# Patient Record
Sex: FEMALE | Race: OTHER | Employment: OTHER | URBAN - METROPOLITAN AREA
[De-identification: names, ages, dates, MRNs, and addresses within clinical notes are randomized per-mention and may not be internally consistent; named-entity substitution may affect disease eponyms.]

---

## 2019-06-12 ENCOUNTER — HOSPITAL ENCOUNTER (OUTPATIENT)
Age: 69
Setting detail: OBSERVATION
Discharge: HOME | End: 2019-06-13
Attending: EMERGENCY MEDICINE | Admitting: HOSPITALIST
Payer: MEDICARE

## 2019-06-12 ENCOUNTER — APPOINTMENT (OUTPATIENT)
Dept: GENERAL RADIOLOGY | Age: 69
End: 2019-06-12
Payer: MEDICARE

## 2019-06-12 DIAGNOSIS — R07.9 CHEST PAIN, UNSPECIFIED TYPE: Primary | ICD-10-CM

## 2019-06-12 LAB
ALBUMIN SERPL-MCNC: 4.5 G/DL (ref 3.5–5.2)
ALP BLD-CCNC: 83 U/L (ref 35–104)
ALT SERPL-CCNC: 20 U/L (ref 0–32)
ANION GAP SERPL CALCULATED.3IONS-SCNC: 8 MMOL/L (ref 7–16)
AST SERPL-CCNC: 23 U/L (ref 0–31)
BILIRUB SERPL-MCNC: 0.3 MG/DL (ref 0–1.2)
BUN BLDV-MCNC: 25 MG/DL (ref 8–23)
CALCIUM SERPL-MCNC: 10.1 MG/DL (ref 8.6–10.2)
CHLORIDE BLD-SCNC: 102 MMOL/L (ref 98–107)
CO2: 32 MMOL/L (ref 22–29)
CREAT SERPL-MCNC: 1.1 MG/DL (ref 0.5–1)
GFR AFRICAN AMERICAN: 60
GFR NON-AFRICAN AMERICAN: 49 ML/MIN/1.73
GLUCOSE BLD-MCNC: 100 MG/DL (ref 74–99)
HCT VFR BLD CALC: 38.7 % (ref 34–48)
HEMOGLOBIN: 12.8 G/DL (ref 11.5–15.5)
MCH RBC QN AUTO: 30.8 PG (ref 26–35)
MCHC RBC AUTO-ENTMCNC: 33.1 % (ref 32–34.5)
MCV RBC AUTO: 93 FL (ref 80–99.9)
PDW BLD-RTO: 13.1 FL (ref 11.5–15)
PLATELET # BLD: 296 E9/L (ref 130–450)
PMV BLD AUTO: 9.1 FL (ref 7–12)
POTASSIUM SERPL-SCNC: 4 MMOL/L (ref 3.5–5)
RBC # BLD: 4.16 E12/L (ref 3.5–5.5)
SODIUM BLD-SCNC: 142 MMOL/L (ref 132–146)
TOTAL PROTEIN: 8.3 G/DL (ref 6.4–8.3)
TROPONIN: <0.01 NG/ML (ref 0–0.03)
WBC # BLD: 9 E9/L (ref 4.5–11.5)

## 2019-06-12 PROCEDURE — 36415 COLL VENOUS BLD VENIPUNCTURE: CPT

## 2019-06-12 PROCEDURE — 85027 COMPLETE CBC AUTOMATED: CPT

## 2019-06-12 PROCEDURE — 84484 ASSAY OF TROPONIN QUANT: CPT

## 2019-06-12 PROCEDURE — 80053 COMPREHEN METABOLIC PANEL: CPT

## 2019-06-12 PROCEDURE — 93005 ELECTROCARDIOGRAM TRACING: CPT | Performed by: EMERGENCY MEDICINE

## 2019-06-12 PROCEDURE — 99285 EMERGENCY DEPT VISIT HI MDM: CPT

## 2019-06-12 PROCEDURE — 71045 X-RAY EXAM CHEST 1 VIEW: CPT

## 2019-06-13 VITALS
TEMPERATURE: 98.2 F | OXYGEN SATURATION: 96 % | RESPIRATION RATE: 18 BRPM | DIASTOLIC BLOOD PRESSURE: 66 MMHG | HEIGHT: 66 IN | BODY MASS INDEX: 38.41 KG/M2 | SYSTOLIC BLOOD PRESSURE: 138 MMHG | WEIGHT: 239 LBS | HEART RATE: 66 BPM

## 2019-06-13 PROBLEM — M94.0 COSTOCHONDRITIS: Status: ACTIVE | Noted: 2019-06-13

## 2019-06-13 LAB
EKG ATRIAL RATE: 71 BPM
EKG P AXIS: 78 DEGREES
EKG P-R INTERVAL: 196 MS
EKG Q-T INTERVAL: 406 MS
EKG QRS DURATION: 86 MS
EKG QTC CALCULATION (BAZETT): 441 MS
EKG R AXIS: 25 DEGREES
EKG T AXIS: 47 DEGREES
EKG VENTRICULAR RATE: 71 BPM
TROPONIN: <0.01 NG/ML (ref 0–0.03)

## 2019-06-13 PROCEDURE — 6370000000 HC RX 637 (ALT 250 FOR IP): Performed by: NURSE PRACTITIONER

## 2019-06-13 PROCEDURE — G0378 HOSPITAL OBSERVATION PER HR: HCPCS

## 2019-06-13 PROCEDURE — 96372 THER/PROPH/DIAG INJ SC/IM: CPT

## 2019-06-13 PROCEDURE — 93010 ELECTROCARDIOGRAM REPORT: CPT | Performed by: INTERNAL MEDICINE

## 2019-06-13 PROCEDURE — 6370000000 HC RX 637 (ALT 250 FOR IP): Performed by: EMERGENCY MEDICINE

## 2019-06-13 PROCEDURE — 2580000003 HC RX 258: Performed by: HOSPITALIST

## 2019-06-13 PROCEDURE — APPSS60 APP SPLIT SHARED TIME 46-60 MINUTES: Performed by: NURSE PRACTITIONER

## 2019-06-13 PROCEDURE — 96375 TX/PRO/DX INJ NEW DRUG ADDON: CPT

## 2019-06-13 PROCEDURE — 99204 OFFICE O/P NEW MOD 45 MIN: CPT | Performed by: INTERNAL MEDICINE

## 2019-06-13 PROCEDURE — 6360000002 HC RX W HCPCS: Performed by: NURSE PRACTITIONER

## 2019-06-13 PROCEDURE — 96374 THER/PROPH/DIAG INJ IV PUSH: CPT

## 2019-06-13 PROCEDURE — 6360000002 HC RX W HCPCS: Performed by: HOSPITALIST

## 2019-06-13 PROCEDURE — 36415 COLL VENOUS BLD VENIPUNCTURE: CPT

## 2019-06-13 PROCEDURE — 6360000002 HC RX W HCPCS: Performed by: EMERGENCY MEDICINE

## 2019-06-13 PROCEDURE — 84484 ASSAY OF TROPONIN QUANT: CPT

## 2019-06-13 RX ORDER — DILTIAZEM HYDROCHLORIDE 240 MG/1
240 CAPSULE, COATED, EXTENDED RELEASE ORAL DAILY
Status: DISCONTINUED | OUTPATIENT
Start: 2019-06-13 | End: 2019-06-13 | Stop reason: HOSPADM

## 2019-06-13 RX ORDER — DILTIAZEM HYDROCHLORIDE EXTENDED-RELEASE TABLETS 240 MG/1
240 TABLET, EXTENDED RELEASE ORAL DAILY
COMMUNITY

## 2019-06-13 RX ORDER — IBUPROFEN 600 MG/1
600 TABLET ORAL EVERY 8 HOURS PRN
Qty: 30 TABLET | Refills: 0 | COMMUNITY
Start: 2019-06-13

## 2019-06-13 RX ORDER — ASPIRIN 81 MG/1
81 TABLET, CHEWABLE ORAL DAILY
Status: DISCONTINUED | OUTPATIENT
Start: 2019-06-13 | End: 2019-06-13 | Stop reason: HOSPADM

## 2019-06-13 RX ORDER — ROSUVASTATIN CALCIUM 10 MG/1
5 TABLET, COATED ORAL DAILY
Status: DISCONTINUED | OUTPATIENT
Start: 2019-06-13 | End: 2019-06-13 | Stop reason: HOSPADM

## 2019-06-13 RX ORDER — RANOLAZINE 500 MG/1
500 TABLET, EXTENDED RELEASE ORAL 2 TIMES DAILY
Status: DISCONTINUED | OUTPATIENT
Start: 2019-06-13 | End: 2019-06-13 | Stop reason: HOSPADM

## 2019-06-13 RX ORDER — HYDROCHLOROTHIAZIDE 25 MG/1
50 TABLET ORAL DAILY
Status: DISCONTINUED | OUTPATIENT
Start: 2019-06-13 | End: 2019-06-13 | Stop reason: HOSPADM

## 2019-06-13 RX ORDER — LEVOTHYROXINE SODIUM 0.1 MG/1
100 TABLET ORAL DAILY
Status: DISCONTINUED | OUTPATIENT
Start: 2019-06-13 | End: 2019-06-13 | Stop reason: HOSPADM

## 2019-06-13 RX ORDER — GABAPENTIN 100 MG/1
100 CAPSULE ORAL 2 TIMES DAILY
COMMUNITY

## 2019-06-13 RX ORDER — SODIUM CHLORIDE 0.9 % (FLUSH) 0.9 %
10 SYRINGE (ML) INJECTION PRN
Status: DISCONTINUED | OUTPATIENT
Start: 2019-06-13 | End: 2019-06-13 | Stop reason: HOSPADM

## 2019-06-13 RX ORDER — HYDRALAZINE HYDROCHLORIDE 20 MG/ML
10 INJECTION INTRAMUSCULAR; INTRAVENOUS ONCE
Status: COMPLETED | OUTPATIENT
Start: 2019-06-13 | End: 2019-06-13

## 2019-06-13 RX ORDER — RANOLAZINE 500 MG/1
500 TABLET, EXTENDED RELEASE ORAL 2 TIMES DAILY
COMMUNITY

## 2019-06-13 RX ORDER — NITROGLYCERIN 0.4 MG/1
0.4 TABLET SUBLINGUAL
COMMUNITY

## 2019-06-13 RX ORDER — KETOROLAC TROMETHAMINE 30 MG/ML
15 INJECTION, SOLUTION INTRAMUSCULAR; INTRAVENOUS ONCE
Status: COMPLETED | OUTPATIENT
Start: 2019-06-13 | End: 2019-06-13

## 2019-06-13 RX ORDER — SODIUM CHLORIDE 0.9 % (FLUSH) 0.9 %
10 SYRINGE (ML) INJECTION EVERY 12 HOURS SCHEDULED
Status: DISCONTINUED | OUTPATIENT
Start: 2019-06-13 | End: 2019-06-13 | Stop reason: HOSPADM

## 2019-06-13 RX ORDER — POTASSIUM CHLORIDE 750 MG/1
20 TABLET, FILM COATED, EXTENDED RELEASE ORAL DAILY
COMMUNITY

## 2019-06-13 RX ORDER — HYDROCHLOROTHIAZIDE 25 MG/1
50 TABLET ORAL DAILY
COMMUNITY

## 2019-06-13 RX ORDER — GABAPENTIN 100 MG/1
100 CAPSULE ORAL 2 TIMES DAILY
Status: DISCONTINUED | OUTPATIENT
Start: 2019-06-13 | End: 2019-06-13 | Stop reason: HOSPADM

## 2019-06-13 RX ORDER — ROSUVASTATIN CALCIUM 5 MG/1
5 TABLET, COATED ORAL DAILY
COMMUNITY

## 2019-06-13 RX ORDER — ONDANSETRON 2 MG/ML
4 INJECTION INTRAMUSCULAR; INTRAVENOUS EVERY 6 HOURS PRN
Status: DISCONTINUED | OUTPATIENT
Start: 2019-06-13 | End: 2019-06-13 | Stop reason: HOSPADM

## 2019-06-13 RX ORDER — LEVOTHYROXINE SODIUM 0.1 MG/1
100 TABLET ORAL DAILY
COMMUNITY

## 2019-06-13 RX ADMIN — Medication 10 ML: at 13:21

## 2019-06-13 RX ADMIN — NITROGLYCERIN 0.5 INCH: 20 OINTMENT TOPICAL at 00:32

## 2019-06-13 RX ADMIN — HYDROCHLOROTHIAZIDE 50 MG: 25 TABLET ORAL at 13:19

## 2019-06-13 RX ADMIN — KETOROLAC TROMETHAMINE 15 MG: 30 INJECTION, SOLUTION INTRAMUSCULAR; INTRAVENOUS at 13:21

## 2019-06-13 RX ADMIN — DILTIAZEM HYDROCHLORIDE 240 MG: 240 CAPSULE, EXTENDED RELEASE ORAL at 13:19

## 2019-06-13 RX ADMIN — ASPIRIN 81 MG 81 MG: 81 TABLET ORAL at 13:19

## 2019-06-13 RX ADMIN — ENOXAPARIN SODIUM 40 MG: 40 INJECTION SUBCUTANEOUS at 08:41

## 2019-06-13 RX ADMIN — Medication 10 ML: at 08:42

## 2019-06-13 RX ADMIN — GABAPENTIN 100 MG: 100 CAPSULE ORAL at 13:19

## 2019-06-13 RX ADMIN — HYDRALAZINE HYDROCHLORIDE 10 MG: 20 INJECTION INTRAMUSCULAR; INTRAVENOUS at 00:50

## 2019-06-13 SDOH — HEALTH STABILITY: MENTAL HEALTH: HOW OFTEN DO YOU HAVE A DRINK CONTAINING ALCOHOL?: NEVER

## 2019-06-13 ASSESSMENT — PAIN DESCRIPTION - ORIENTATION: ORIENTATION: RIGHT;LEFT

## 2019-06-13 ASSESSMENT — PAIN DESCRIPTION - FREQUENCY: FREQUENCY: CONTINUOUS

## 2019-06-13 ASSESSMENT — PAIN SCALES - GENERAL
PAINLEVEL_OUTOF10: 0
PAINLEVEL_OUTOF10: 6
PAINLEVEL_OUTOF10: 0
PAINLEVEL_OUTOF10: 4

## 2019-06-13 ASSESSMENT — PAIN DESCRIPTION - ONSET: ONSET: GRADUAL

## 2019-06-13 ASSESSMENT — PAIN DESCRIPTION - LOCATION: LOCATION: BACK

## 2019-06-13 ASSESSMENT — PAIN DESCRIPTION - PROGRESSION: CLINICAL_PROGRESSION: GRADUALLY IMPROVING

## 2019-06-13 ASSESSMENT — PAIN DESCRIPTION - PAIN TYPE: TYPE: ACUTE PAIN

## 2019-06-13 ASSESSMENT — PAIN DESCRIPTION - DESCRIPTORS: DESCRIPTORS: ACHING

## 2019-06-13 NOTE — ED NOTES
revitaled pt to fax sbar pt had elevated BP new orders per doctor and wait to send up until BP comes down     Ruthy Johnson RN  06/13/19 0001

## 2019-06-13 NOTE — ED PROVIDER NOTES
Department of Emergency Medicine   ED  Provider Note  Admit Date/RoomTime: 6/12/2019 10:52 PM  ED Room: 01/01      History of Present Illness:  6/12/19, Time: 10:45 PM       Rupa Bwoers is a 71 y.o. female presenting to the ED for chest pain, beginning yesterday. The complaint has been persistent, moderate in severity, and worsened by nothing. The pt reports that she has been having pain in her chest since yesterday, went to [facility] today for the same complaint and was advised to follow up with cardiologist and if worsened to go to the ED. Notes her symptoms never got better and came in for evaluation. States that she took 4 Nitro yesterday with minimal relief and took 4 ASA today and still had minimal relief. States that she was diaphoretic when the pain started but no so much today. Rates the pain a 6 on a scale. Hx of DM and HTN. Pt denies any loc, palpitations, fever, chills, nausea, vomiting, diarrhea, abdominal pain, neck pain, extremity pain, edema, HA, cough, congestion, visual disturbances, dysuria, constipation, hematuria, weakness, numbness, tingling or any other further symptoms at this time. Review of Systems:   Pertinent positives and negatives are stated within HPI, all other systems reviewed and are negative.    --------------------------------------------- PAST HISTORY ---------------------------------------------  Past Medical History:  has a past medical history of Arthritis, Diabetes mellitus ([facility] [address] Heart attack ([facility] [address] Hypertension, and Thyroid disease. Past Surgical History:  has no past surgical history on file. Social History:  reports that she has never smoked. She has never used smokeless tobacco. She reports that she drank alcohol. She reports that she has current or past drug history. Family History: family history includes Heart Attack in her father and sister. The patients home medications have been reviewed.     Allergies: Sulfa antibiotics      ---------------------------------------------------PHYSICAL EXAM--------------------------------------    Constitutional/General: Awake, Alert, and oriented x3, mild distress  Head: Normocephalic and atraumatic  Eyes: PERRL, EOMI,  Mouth: Oropharynx clear, handling secretions, no trismus  Neck: Supple, full ROM  Pulmonary: Lungs clear to auscultation bilaterally, no wheezes, rales, or rhonchi. Not in respiratory distress  Cardiovascular:  Regular rate. Regular rhythm. No murmurs, gallops, or rubs. 2+ distal pulses  Chest: No chest wall tenderness  Abdomen: Soft. Non tender. Non distended. +BS. No rebound, guarding, or rigidity. No pulsatile masses appreciated. Musculoskeletal: Bilateral ankle edema. Moves all extremities x 4. no clubbing or cyanosis. Capillary refill <3 seconds  Skin: warm and dry. No rashes. Neurologic: GCS 15, CN II-XII grossly intact, no focal deficits,  Psych: Normal Affect    -------------------------------------------------- RESULTS -------------------------------------------------  I have personally reviewed all laboratory and imaging results for this patient. Results are listed below.      LABS:  Results for orders placed or performed during the hospital encounter of 06/12/19   CBC   Result Value Ref Range    WBC 9.0 4.5 - 11.5 E9/L    RBC 4.16 3.50 - 5.50 E12/L    Hemoglobin 12.8 11.5 - 15.5 g/dL    Hematocrit 38.7 34.0 - 48.0 %    MCV 93.0 80.0 - 99.9 fL    MCH 30.8 26.0 - 35.0 pg    MCHC 33.1 32.0 - 34.5 %    RDW 13.1 11.5 - 15.0 fL    Platelets 040 544 - 150 E9/L    MPV 9.1 7.0 - 12.0 fL   Comprehensive Metabolic Panel   Result Value Ref Range    Sodium 142 132 - 146 mmol/L    Potassium 4.0 3.5 - 5.0 mmol/L    Chloride 102 98 - 107 mmol/L    CO2 32 (H) 22 - 29 mmol/L    Anion Gap 8 7 - 16 mmol/L    Glucose 100 (H) 74 - 99 mg/dL    BUN 25 (H) 8 - 23 mg/dL    CREATININE 1.1 (H) 0.5 - 1.0 mg/dL    GFR Non-African American 49 >=60 mL/min/1.73    GFR African American 60 Calcium 10.1 8.6 - 10.2 mg/dL    Total Protein 8.3 6.4 - 8.3 g/dL    Alb 4.5 3.5 - 5.2 g/dL    Total Bilirubin 0.3 0.0 - 1.2 mg/dL    Alkaline Phosphatase 83 35 - 104 U/L    ALT 20 0 - 32 U/L    AST 23 0 - 31 U/L   Troponin   Result Value Ref Range    Troponin <0.01 0.00 - 0.03 ng/mL       RADIOLOGY:  Interpreted by Radiologist.  XR CHEST PORTABLE   Final Result      Atelectasis versus infiltrate of the lung bases. EKG:  This EKG is signed and interpreted by the EP. Time: 22:38  Rate: 71  Rhythm: Sinus  Interpretation: no acute changes  Comparison: no previous EKG available    ------------------------- NURSING NOTES AND VITALS REVIEWED ---------------------------   The nursing notes within the ED encounter and vital signs as below have been reviewed by myself. BP (!) 173/103   Pulse 87   Temp 98.7 °F (37.1 °C) (Temporal)   Resp 14   Ht 5' 6\" (1.676 m)   Wt 230 lb (104.3 kg)   SpO2 95%   Breastfeeding? No   BMI 37.12 kg/m²   Oxygen Saturation Interpretation: Normal    The patients available past medical records and past encounters were reviewed. ------------------------------ ED COURSE/MEDICAL DECISION MAKING----------------------  Medications   nitroglycerin (NITRO-BID) 2 % ointment 0.5 inch (has no administration in time range)       Medical Decision Making:    Patient assessed. Imaging and Labs obtained and reviewed; results discussed with patient. This patient's ED course included: a personal history and physical examination and re-evaluation prior to disposition. This patient has been closely monitored during their ED course. Re-Evaluations:           Re-evaluation. Patient symptoms show no change. Pt made aware of findings. Consultations:             IM  Counseling: The emergency provider has spoken with the patient and discussed todays results, in addition to providing specific details for the plan of care and counseling regarding the diagnosis and prognosis.

## 2019-06-13 NOTE — ED NOTES
Bed: 01  Expected date:   Expected time:   Means of arrival:   Comments:  triage     Lj Morales RN  06/12/19 0261

## 2019-06-13 NOTE — CONSULTS
emergency department, her vital signs were temporal  temperature of 98.7, heart rate 87 beats per minute, respiratory rate  14, blood pressure 173/103, oxygen saturation 95% on room air. A  12-lead EKG was obtained that showed sinus rhythm. WBC is 9, H and H  12.8 and 38.7.  BUN/SCR 25/1.1. Troponin less than 0.01. Chest x-ray  with atelectasis versus infiltrate to the lung bases. She received  0.5-inch nitroglycerin paste and was admitted to a telemetry monitored  unit for further evaluation and management. Cardiology was consulted by  Dr. Jody Vásquez for further evaluation of chest pain with a history of MI and  coronary artery disease. Currently, the patient is sitting up in bed. She denies chest pain,  dyspnea. Vital signs are stable. Telemetry monitoring shows sinus  rhythm and she is in no acute distress. PAST MEDICAL/SURGICAL HISTORY:  1. Reported coronary artery disease, status post cardiac  catheterization without intervention, both in 2008 and 2012 [facility], reports requested, currently unavailable for further  evaluation. Per the patient, no intervention warranted, per the patient  with collateral vessels). 2.  Diabetes mellitus. 3.  Hypertension. 4.  Obesity. 5.  Hypothyroidism, on replacement therapy. 6.  Chronic right knee pain. 7.  History of pelvic mass and complex endometrial hyperplasia, status  post D and C and hysterectomy. 8.  Arthritis. 9.  Status post right knee replacement. 10.  Status post tonsillectomy. 12.  Status post lumbar back surgery in 2012 (for herniated disk). 13.  Chronic kidney disease stage 2. FAMILY HISTORY:  Please note this information was not obtained at this  time as this is limited in nature due to the patient's advanced age. SOCIAL HISTORY:  Denies tobacco, alcohol, or illicit drug use. Caffeine  intake includes one cup of regular coffee daily, \"the rest of the coffee  I have throughout the day is Decaf. \"    ALLERGIES: SULFA.    HOME MEDICATIONS:  Aspirin 81 mg daily, Cardizem 240 mg daily,  Neurontin, hydrochlorothiazide 50 mg daily, Synthroid 100 mcg daily,  metformin, sublingual nitroglycerin as needed, K-Dur 20 mEq daily,  Ranexa 500 mg twice daily, and Crestor 5 mg daily. HOSPITAL MEDICATIONS:  Aspirin 81 mg daily, Cardizem  mg daily,  Lovenox 40 mg daily, Neurontin, hydralazine 10 mg x1 IV,  hydrochlorothiazide 50 mg daily, Synthroid 100 mcg daily, Ranexa 500 mg  twice daily, Crestor 5 mg daily. REVIEW OF SYSTEMS:  CONSTITUTIONAL:  Denies fevers, chills, night  sweats, weight loss, or fatigue. HEENT:  Denies headaches, nosebleeds,  oral pain, abscess, or lesion. Complains of blurred vision at times. MUSCULOSKELETAL:  Denies falls. Denies pain to bilateral lower  extremities with ambulation. Complains of edema to bilateral lower  extremities. See HPI. NEURO:  Complains of dizziness, lightheadedness  without fall or syncope. See HPI. Denies numbness and tingling. CARDIAC:  Chest pain. See HPI. Denies palpitations, feelings of her  heart racing. LUNGS:  Denies orthopnea and PND. GI:  Denies heartburn,  abdominal pain, nausea, vomiting, diarrhea, constipation;  black/bloody/tarry stools. :  Denies dysuria and hematuria. HEME:   Denies excessive bleeding or bruising. LYMPHS:  Denies lumps and bumps  to neck, axilla, breast, and groin. ENDOCRINE:  Denies excessive  thirst.  Denies intolerances to hot and cold. PSYCHIATRIC:  Denies  anxiety and depression. PHYSICAL EXAMINATION:  VITAL SIGNS:  Oral temperature 97.2, heart rate 70 beats per minute,  respiratory rate 16, blood pressure 140/66, oxygen saturation 96% on  room air. Weight 239 pounds, BMI 38.7. CONSTITUTIONAL:  In general, this is a well-developed, obese, elderly  [facility] female, who appears her stated age, who is awake,  alert, cooperative, in no apparent distress. HEENT:  Eyes:  Conjunctivae pink. No noted xanthomas. Oral mucosa pink  and moist.  NECK:  No stridor. Symmetrical, nontender. No JVD. CHEST:  Symmetrical.  Nontender with palpation. No accessory muscle  use. No intercostal retractions. Lung on auscultation, clear to all  fields. CARDIOVASCULAR:  No carotid bruit noted. Heart on inspection shows no  noted pulsations. Heart on palpation, no heaves or thrills. PMI  nondisplaced. Heart on auscultation, regular rate and rhythm. No  murmur or rub. PERIPHERAL VASCULAR:  No bilateral lower extremity edema. Pedal pulses  are palpable and equal.  No clubbing or cyanosis. ABDOMEN:  Soft, nontender with palpation. Bowel sounds present x4  quadrants. No palpable masses. No abdominal bruits or pulsation. MUSCULOSKELETAL:  Good muscle strength and tone. No atrophy or abnormal  movements. :  Deferred. SKIN:  Warm and dry. No stasis dermatitis or ulcerations. NEUROLOGIC:  Alert and oriented x3. Speech is clear and appropriate. Follows simple commands. Moves all extremities. Pleasant affect. LABS/TESTS:  Sodium 142, potassium 4, chloride 102, CO2 of 32, BUN 25,  creatinine 1.1. Blood glucose 100. Troponin less than 0.01 x2. WBC is  9, hemoglobin 12.8, hematocrit 38.7, platelets 630,183. Chest x-ray:   Atelectasis versus infiltrate of the lung bases. IMPRESSION as per Dr. Chico Jones:  1. Noncardiac chest pain, continuous with normal EKG and negative  troponin x2.  2.  Lightheadedness likely secondary to orthostatic hypotension and  vasovagal reaction. 3.  Coronary artery disease with reported occluded coronary artery with  collaterals (per the patient). 4.  Morbid obesity. 5.  Hypertension. 6.  Diabetes mellitus. 7.  Hypothyroidism, on replacement therapy. 8.  Chronic kidney disease stage 2.  9.  Chronic right knee pain. PLAN as per Dr. Chico Jones:  1. Continue home medications. 2.  No need or indications for further cardiac testing.   3.  The patient has a followup appointment with her cardiologist in  [facility] on 06/21/2019:  Lauren Mckeon to keep. 4.  May be discharged from Cardiology standpoint. 5.  Cardiology will sign off. Please call if needed. JESS Silver     D: 06/13/2019 14:12:46       T: 06/13/2019 15:09:03     [email or web address]  Job#: [id number]     Doc#: [id number]  CC:    I personally and independently saw and examined patient and reviewed all done pertinent laboratory data, imaging studies, ECGs and rhythm strips. I have reviewed and agree with the APN history and physical exam as documented in the above note.     Electronically signed by Loco Garcia MD on 6/13/2019 at 5:40 PM

## 2019-06-13 NOTE — CONSULTS
Patient seen and examined. Chart, labs, imaging studies, EKG and rhythm strips reviewed. Full consult to follow. We were asked to see the patient for chest pain. PHYSICAL EXAMINATION:  VITAL SIGNS:  Oral temperature 97.2, heart rate 70 beats per minute,  respiratory rate 16, blood pressure 140/66, oxygen saturation 96% on  room air. Weight 239 pounds, BMI 38.7. CONSTITUTIONAL:  In general, this is a well-developed, obese, elderly  [facility] female, who appears her stated age, who is awake,  alert, cooperative, in no apparent distress. HEENT:  Eyes:  Conjunctivae pink. No noted xanthomas. Oral mucosa pink  and moist.  NECK:  No stridor. Symmetrical, nontender. No JVD. CHEST:  Symmetrical.  Nontender with palpation. No accessory muscle  use. No intercostal retractions. Lung on auscultation, clear to all  fields. CARDIOVASCULAR:  No carotid bruit noted. Heart on inspection shows no  noted pulsations. Heart on palpation, no heaves or thrills. PMI  nondisplaced. Heart on auscultation, regular rate and rhythm. No  murmur or rub. PERIPHERAL VASCULAR:  No bilateral lower extremity edema. Pedal pulses  are palpable and equal.  No clubbing or cyanosis. ABDOMEN:  Soft, nontender with palpation. Bowel sounds present x4  quadrants. No palpable masses. No abdominal bruits or pulsation. MUSCULOSKELETAL:  Good muscle strength and tone. No atrophy or abnormal  movements. :  Deferred. SKIN:  Warm and dry. No stasis dermatitis or ulcerations. NEUROLOGIC:  Alert and oriented x3. Speech is clear and appropriate. Follows simple commands. Moves all extremities. Pleasant affect. IMPRESSION:  1. Noncardiac chest pain, continuous with normal EKG and negative Troponin x2.   2. Lightheadedness likely secondary to orthostatic hypotension and vasovagal reaction. 3. CAD with reported occluded coronary artery with collaterals (per patient)  4. Morbid obesity  5.  HTN  6. DM 7. Hypothyroidism on replacement therapy   8. CKD, Stage II  9. Chronic right knee pain      PLAN:   1. Continue home medications   2. No need or indications for further cardiac testing   3. Patient has follow up apponnment with her cardiologist in the [facility] area on the 21st of this month: Advised to keep  4. May be discharged from Cardiology standpoint. 5. Cardiology will sign off. Please call if needed.      Electronically signed by Christy Rock MD on 6/13/2019 at 12:40 PM

## 2019-06-13 NOTE — H&P
Hospital Medicine History & Physical      PCP: No primary care provider on file. Date of Admission: 6/12/2019    Date of Service: Pt seen/examined on 06/13/19   Placed in Observation. Chief Complaint:  Chest pain     History Of Present Illness:  Records reviewed. This is a  71 y.o. female who presented to [address] [place] [address] with chest pain. The pt reports that she has been having pain in her chest since yesterday, went to [facility] today for the same complaint and was advised to follow up with cardiologist and if worsened to go to the ED. Notes her symptoms never got better and came in for evaluation. States that she took 4 Nitro yesterday with minimal relief and took 4 ASA today and still had minimal relief. States that she was diaphoretic when the pain started but no so much today  ED course: 173/103, HR 87, afebrile and 95% RA. CXR negative. Troponin X 2 negative. Cardiology consulted. 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 She did notice increased chest pain around the center of her chest below her breasts also upper bilateral chest with palpation. She does admit to helping her elderly mother move around and has been lifting more often. She does have pain to bilateral rib areas with palpation and pain increases in that area when she sits up or twists. He denies any shortness of breath, chest pain with radiation to her jaw or left arm, increased fatigue or nausea. Past Medical History:          Diagnosis Date    Arthritis     CAD (coronary artery disease)     Diabetes mellitus ([facility] [address]     Heart attack ([facility] [address] 2008    Hyperlipidemia     Hypertension     Thyroid disease        Past Surgical History:          Procedure Laterality Date    COLONOSCOPY      HYSTERECTOMY      JOINT REPLACEMENT      right knee       Medications Prior to Admission:      Prior to Admission medications    Medication Sig Start Date End Date Taking?  Authorizing Provider ranolazine (RANEXA) 500 MG extended release tablet Take 500 mg by mouth 2 times daily   Yes Historical Provider, MD   aspirin 81 MG tablet Take 81 mg by mouth daily   Yes Historical Provider, MD   levothyroxine (SYNTHROID) 100 MCG tablet Take 100 mcg by mouth Daily   Yes Historical Provider, MD   dilTIAZem HCl ER Coated Beads 240 MG TB24 Take 240 mg by mouth daily   Yes Historical Provider, MD   gabapentin (NEURONTIN) 100 MG capsule Take 100 mg by mouth 2 times daily. Yes Historical Provider, MD   hydrochlorothiazide (HYDRODIURIL) 25 MG tablet Take 50 mg by mouth daily   Yes Historical Provider, MD   potassium chloride (KLOR-CON) 10 MEQ extended release tablet Take 20 mEq by mouth daily   Yes Historical Provider, MD   metFORMIN (GLUCOPHAGE) 500 MG tablet Take 500 mg by mouth daily   Yes Historical Provider, MD   rosuvastatin (CRESTOR) 5 MG tablet Take 5 mg by mouth daily   Yes Historical Provider, MD   nitroGLYCERIN (NITROSTAT) 0.4 MG SL tablet Place 0.4 mg under the tongue Indications: for chest pain up to max of 3 total doses. If no relief after 1 dose, call 911. Yes Historical Provider, MD   ibuprofen (ADVIL;MOTRIN) 600 MG tablet Take 1 tablet by mouth every 8 hours as needed for Pain 6/13/19  Yes Alaina De La Vega APRN - CNP       Allergies:  Sulfa antibiotics    Social History:      The patient currently lives home    TOBACCO:   reports that she has never smoked. She has never used smokeless tobacco.  ETOH:   reports that she does not drink alcohol. Family History:      Reviewed in detail and negative for DM, CAD, Cancer, CVA. Positive as follows:        Problem Relation Age of Onset    Heart Attack Father     Arthritis Father     Heart Attack Sister     Arthritis Mother        REVIEW OF SYSTEMS:   Pertinent positives as noted in the HPI. All other systems reviewed and negative.     PHYSICAL EXAM:    /66   Pulse 66   Temp 98.2 °F (36.8 °C) (Temporal)   Resp 18   Ht 5' 6\" (1.676 m)   Wt 239 lb (108.4 kg)   LMP  (LMP Unknown)   SpO2 96%   Breastfeeding? No   BMI 38.58 kg/m²     General appearance:  No apparent distress, appears stated age and cooperative. HEENT:  Normal cephalic, atraumatic without obvious deformity. Pupils equal, round, and reactive to light. Extra ocular muscles intact. Conjunctivae/corneas clear. Neck: Supple, with full range of motion. No jugular venous distention. Trachea midline. Respiratory:  Normal respiratory effort. Clear to auscultation, bilaterally without Rales/Wheezes/Rhonchi. Cardiovascular:  Regular rate and rhythm with normal S1/S2 without murmurs, rubs or gallops. Abdomen: Soft, non-tender, non-distended with normal bowel sounds. Musculoskeletal:  No clubbing, cyanosis or edema bilaterally. Full range of motion without deformity. Skin: Skin color, texture, turgor normal.  No rashes or lesions. Neurologic:  Neurovascularly intact without any focal sensory/motor deficits. Psychiatric:  Alert and oriented, thought content appropriate, normal insight  Capillary Refill: Brisk,< 3 seconds   Peripheral Pulses: +2 palpable, equal bilaterally     Xr Chest Portable    Result Date: 6/12/2019  Clinical indications: Chest pain. TECHNIQUE: Single frontal projection of the chest (1 view). COMPARISON: None. FINDINGS: The heart is upper limits normal in size. There is calcification within thoracic aorta. Acromioclavicular arthropathy. Atelectasis versus infiltrate of the lung bases. Severe degenerative changes of the left shoulder. The heart, lungs, mediastinum and regional skeleton are otherwise unremarkable. Atelectasis versus infiltrate of the lung bases.      EKG: SR     Labs:     Recent Labs     06/12/19  2245   WBC 9.0   HGB 12.8   HCT 38.7        Recent Labs     06/12/19  2245      K 4.0      CO2 32*   BUN 25*   CREATININE 1.1*   CALCIUM 10.1     Recent Labs     06/12/19  2245   AST 23   ALT 20   BILITOT 0.3   ALKPHOS 83     No results for input(s): INR in the last 72 hours. Recent Labs     06/12/19  2245 06/13/19  0405   TROPONINI <0.01 <0.01       Urinalysis:    No results found for: Cal , WBCUA, BACTERIA, RBCUA, BLOODU, SPECGRAV, GLUCOSEU      ASSESSMENT:    Active Hospital Problems    Diagnosis Date Noted    Costochondritis [M94.0] 06/13/2019    CAD (coronary artery disease) [I25.10]     Diabetes mellitus ([facility] [address] [E11.9]     Hypertension [I10]     Chest pain, unspecified [R07.9] 06/12/2019       Atypical chest pain and suspect costochondritis due to activity lifting and helping her elderly mother. She also has increased pain with palpation to lower ribs bilaterally and upper chest ribs when palpated. Cardiology consult made and negative trop ; plans to see her personal cardiologist in [place] in 9 days for follow up. PLAN:    Admitted today   Cardio consult- ok to go home  Troponin X 3 negative   toradol X 1 dose   Ibuprofen 600 BID and tylenol for pain in between temporary use of NSAIDs   Follow with primary cardiology as scheduled   pcp follow up   Continue home meds     DVT Prophylaxis: lovenox   Diet: DIET CARDIAC;  Code Status: Full Code    PT/OT Eval Status: na     Dispo - tele admit/obs ; discharge same day        JESS Beard CNP    Thank you No primary care provider on file. for the opportunity to be involved in this patient's care.  If you have any questions or concerns please feel free to contact me at [phone]

## 2019-06-13 NOTE — PLAN OF CARE
No falls or injury this shift. Patient will be free of falls or injuries during stay on CSU  Call light in reach instructed to call with needs call light No falls or injury this shift.  answered promptly dim light in room at night to aid in vision when OOB  Non skid foot wear on when OOB

## 2025-04-29 NOTE — DISCHARGE SUMMARY
Please see H& P note for same day admit/discharge.    Thank you   Charlie Escobedo, APRN-[facility] [place]
No